# Patient Record
Sex: MALE | Race: WHITE | NOT HISPANIC OR LATINO | URBAN - METROPOLITAN AREA
[De-identification: names, ages, dates, MRNs, and addresses within clinical notes are randomized per-mention and may not be internally consistent; named-entity substitution may affect disease eponyms.]

---

## 2023-03-31 ENCOUNTER — OFFICE (OUTPATIENT)
Dept: URBAN - METROPOLITAN AREA CLINIC 12 | Facility: CLINIC | Age: 23
End: 2023-03-31

## 2023-03-31 VITALS
DIASTOLIC BLOOD PRESSURE: 78 MMHG | WEIGHT: 137 LBS | SYSTOLIC BLOOD PRESSURE: 126 MMHG | HEIGHT: 71 IN | HEART RATE: 72 BPM | OXYGEN SATURATION: 96 %

## 2023-03-31 DIAGNOSIS — R10.9 UNSPECIFIED ABDOMINAL PAIN: ICD-10-CM

## 2023-03-31 DIAGNOSIS — R14.0 ABDOMINAL DISTENSION (GASEOUS): ICD-10-CM

## 2023-03-31 DIAGNOSIS — R14.3 FLATULENCE: ICD-10-CM

## 2023-03-31 DIAGNOSIS — R19.5 OTHER FECAL ABNORMALITIES: ICD-10-CM

## 2023-03-31 PROCEDURE — 99204 OFFICE O/P NEW MOD 45 MIN: CPT | Performed by: NURSE PRACTITIONER

## 2023-03-31 RX ORDER — DICYCLOMINE HYDROCHLORIDE 10 MG/1
CAPSULE ORAL
Qty: 30 | Refills: 3 | Status: ACTIVE
Start: 2023-03-31

## 2023-03-31 NOTE — SERVICENOTES
sounds like he likely has IBS seen he has had these symptoms pretty much since he was a child.   His symptoms are likely diet related is well.   We will check the above labs and stool studies.   discussed low FODMAP diet and avoid  fried, greasy, fatty foods.   He does eat a lot of quesadillas, pizza, chicken tenders.  I feel his abdominal pain is likely related to bowel movements and not actually eating.   Will try him on a course of dicyclomine.   He will start on a fiber supplement to help bulk up his stools and will see back in 2 months or sooner if needed

## 2023-03-31 NOTE — SERVICEHPINOTES
Mr. Olivia is a 22 year old make here today with complaints of  altered bowel habits,  bloating, gas, vomiting. He reports GI issues since he was a child. He has sharp abdominal pain while eating that last a few minutes and resolved when he has a bowel movement. This happens most if the time he eats. He has loose stools after eating. He has 2 loose stools and 1 formed stool a day.  He does not have the healthiest diet and eats a lot of Mexican food, pizza, chicken tenders and thinks this could be the source of his symptoms.  He reports occasionally he will vomit if he drinks too much caffeine or alcohol on an empty stomach. Beer upsets his stomach and lactose makes him gassy.  He denies any heartburn, reflux, dysphagia, weight loss, blood in stool.

## 2023-04-04 LAB
ALLERGEN PROFILE, BASIC FOOD: CLASS DESCRIPTION: (no result)
ALLERGEN PROFILE, BASIC FOOD: F002-IGE MILK: <0.1 KU/L
ALLERGEN PROFILE, BASIC FOOD: F004-IGE WHEAT: <0.1 KU/L
ALLERGEN PROFILE, BASIC FOOD: F008-IGE CORN: <0.1 KU/L
ALLERGEN PROFILE, BASIC FOOD: F013-IGE PEANUT: <0.1 KU/L
ALLERGEN PROFILE, BASIC FOOD: F014-IGE SOYBEAN: <0.1 KU/L
ALLERGEN PROFILE, BASIC FOOD: F026-IGE PORK: <0.1 KU/L
ALLERGEN PROFILE, BASIC FOOD: F027-IGE BEEF: <0.1 KU/L
ALLERGEN PROFILE, BASIC FOOD: F093-IGE CHOCOLATE/CACAO: <0.1 KU/L
ALLERGEN PROFILE, BASIC FOOD: F245-IGE EGG, WHOLE: <0.1 KU/L
ALLERGEN PROFILE, BASIC FOOD: FX02-IGE FOOD MIX (SEAFOODS): NEGATIVE
CELIAC DISEASE II: ENDOMYSIAL ANTIBODY IGA: NEGATIVE
CELIAC DISEASE II: IMMUNOGLOBULIN A, QN, SERUM: 126 MG/DL (ref 90–386)
CELIAC DISEASE II: T-TRANSGLUTAMINASE (TTG) IGA: <2 U/ML
CELIAC DISEASE II: T-TRANSGLUTAMINASE (TTG) IGG: <2 U/ML
INFLAMMATORY BOWEL DISEASE-IBD: ATYPICAL PANCA: (no result) TITER
INFLAMMATORY BOWEL DISEASE-IBD: SACCHAROMYCES CEREVISIAE, IGA: <20 UNITS
INFLAMMATORY BOWEL DISEASE-IBD: SACCHAROMYCES CEREVISIAE, IGG: <20 UNITS